# Patient Record
Sex: FEMALE | Race: WHITE | Employment: FULL TIME | URBAN - METROPOLITAN AREA
[De-identification: names, ages, dates, MRNs, and addresses within clinical notes are randomized per-mention and may not be internally consistent; named-entity substitution may affect disease eponyms.]

---

## 2022-10-24 NOTE — PROGRESS NOTES
[address]  11/7/22  Referring: ANNETTE Person     REASON FOR CONSULT/CHIEF COMPLAINT/HPI     Reason for visit/ Chief complaint     Chief Complaint   Patient presents with    New Patient    Chest Pain     sometimes    Other     Sometimes very sharp pain under shoulder blades    Shortness of Breath     W/exertion       HPI Racquel Valadez is a 56 y.o. new patient here by referral from [place], [place] for transfer of care from [facility]. Pt was seen by cardiology for clearance for colonoscopy on 9/22/22 and noted to be in intermittent idioventricular rhythm, LBBB pattern and NSVT while in office. Patient was sent to ED for further eval and admitted. She then underwent L&R Heart Cath. Family history shows Father having HTN. She has never smoked, current alcohol use of 5 drinks per week and does not have drug use history. Pt is a water . Today she complains of L chest pain and L pain under shoulder blade in back at times, but they do not happen at the same time. She denies SOB, dizziness or palpitations at the time. Overall she is NYHA I. She wants to start working out again and wants to know if that would be safe. Patient is adherent with medications and is tolerating them well without side effects     HISTORY/ALLERGIES/ROS     MedHx:  has a past medical history of Diabetes ([address] and Hyperlipidemia. SurgHx:  has no past surgical history on file. SocHx:  reports that she has never smoked. She has never used smokeless tobacco. She reports that she does not currently use alcohol. She reports that she does not use drugs. FamHx: No family history of premature coronary artery disease, sudden death, or aneurysm  Allergies: Lisinopril     MEDICATIONS      Prior to Admission medications    Medication Sig Start Date End Date Taking?  Authorizing Provider   atorvastatin (LIPITOR) 10 MG tablet TAKE 1 TABLET BY MOUTH EVERYDAY AT BEDTIME 10/20/22  Yes Historical Provider, MD   furosemide (LASIX) 20 MG tablet Take by mouth daily 10/18/22 11/17/22 Yes Historical Provider, MD   magnesium oxide (MAG-OX) 400 MG tablet Take by mouth daily 9/29/22  Yes Historical Provider, MD   metoprolol succinate (TOPROL XL) 25 MG extended release tablet Take by mouth daily 10/18/22 11/17/22 Yes Historical Provider, MD   potassium chloride (KLOR-CON) 10 MEQ extended release tablet Take by mouth 2 times daily 10/18/22 11/17/22 Yes Historical Provider, MD   sacubitril-valsartan (ENTRESTO) 24-26 MG per tablet Take 1 tablet by mouth 2 times daily 10/18/22 11/17/22 Yes Historical Provider, MD   traZODone (DESYREL) 100 MG tablet Take by mouth nightly as needed   Yes Historical Provider, MD   empagliflozin (JARDIANCE) 10 MG tablet Take 1 tablet by mouth daily 11/3/22  Yes Christelle Martino MD       PHYSICAL EXAM        Vitals:    11/07/22 0931   BP: 100/64   Pulse: 68   SpO2: 98%    Weight: 179 lb 9.6 oz (81.5 kg)     Gen Alert, cooperative, no distress Heart  Regular rate and rhythm, no murmur   Head Normocephalic, atraumatic, no abnormalities Abd  Soft, NT, +BS, no mass, no OM   Eyes PERRLA, conj/corn clear Ext  Ext nl, AT, no C/C, no edema   Nose Nares normal, no drain age, Non-tender Pulse 2+ and symmetric   Throat Lips, mucosa, tongue normal Skin Color/text/turg nl, no rash/lesions   Neck S/S, TM, NT, no bruit Psych Nl mood and affect   Lung CTA-B, unlabored, no DTP     Ch wall NT, no deform       LABS and Imaging     Relevant and available CV data reviewed    EKG personally interpreted: 9/23/22  Otherwise normal ECG    Sinus rhythm    Ventricular premature complex    Low voltage, precordial leads      Lipid 4/21/22  TG59 HDL55 [id number]   BNP 9/24/22 - 117  Troponin I 9/22/22 - 3     Echo 9/22/22  SUMMARY:   1. Left ventricle: The cavity size was mildly dilated. Wall      thickness was normal. Systolic function was moderately to      severely reduced.  The estimated ejection fraction was in the range of 30% to 35%. Severe diffuse hypokinesis with regional      variations. Doppler parameters are consistent with abnormal left      ventricular relaxation (grade 1 diastolic dysfunction). 2. Aortic valve: Peak velocity (S): 1.16m/sec. Mean gradient (S):      4mm Hg. VTI ratio of LVOT to aortic valve: 0.72. Valve area      (VTI): 2.25cm^2. 3. Mitral valve: There was mild regurgitation. 4. Left atrium: The atrium was mildly dilated. 5. Right ventricle: The cavity size was normal. Wall thickness was      normal. Systolic function was mildly reduced by visual      assessment. 6. Pulmonary arteries: Estimated PA peak pressure is 23mm Hg (S). 7. Pericardium, extracardiac: There was no pericardial effusion. Cath: 9/22/22  PROCEDURES PERFORMED:   1:  Selective angiography of the RCA in multiple views   2:  Selective angiography of the Left Coronary System in multiple   views   3:  [facility] with LV pressure measurement with LV-AO pullback gradient    CONCLUSIONS:   1:  No significant obstructive coronary artery disease   2:  Non ischemic cardiomyopathy        ModerateRisk  ModerateComplexity/Medical Decision Making    Outside/Care everywhere records Reviewed  Labs Reviewed  Prior Imaging, ekg, cath, echo reviewed when available  Medications reviewed  Old Notes reviewed  ASSESSMENT AND PLAN     1. 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  NSVT Plan  Toprol XL 25mg daily,potassium 10mEq BID ,magnesium 400mg daily   F/u with Dr. Nilda Weeks  Per Dr. Nilda Weeks likely LVOT versus RVOT tachy which are low-risk for sudden cardiac death    3. HTN   Needs better control of diet with low sodium and cholesterol and exercise regularly, taking meds on time   Plan: Entresto, Toprol XL    4. Pre-diabetes   Life style control   A1C 4/21/22 5.9  Plan: diet and exercise control   Patient counseled on lifestyle modification, diet, and exercise. 5. Preop cardiac examination for colonoscopy  Plan: she can proceed for colonoscopy at low cardiac risk as she is euvolemic, NYHA I, with normal coronaries, and on appropriate medications with no recurrence of arrhythmia. She will need to remain on all her cardiac meds periprocedurally. Follow Up: 3 months     Carla Barker MD   Cardiologist [facility]    Scribe Attestation: This note was scribed in the presence of Dr. Randa Sheppard  by Álvaro Martin RN. Physician Attestation  The scribe wrote this note in the presence of me Carla Barker MD). The scribe may have prepopulated components of this note with my historical  intellectual property under my direct supervision. Any additions to this intellectual property were performed in my presence and at my direction. Furthermore, the content and accuracy of this note have been reviewed by me with edits by me as needed.   Carla Barker MD 11/7/2022 10:45 AM

## 2022-10-25 PROBLEM — I47.29 NSVT (NONSUSTAINED VENTRICULAR TACHYCARDIA): Status: ACTIVE | Noted: 2022-10-25

## 2022-10-25 PROBLEM — R73.03 PRE-DIABETES: Status: ACTIVE | Noted: 2022-10-25

## 2022-10-25 PROBLEM — I10 HTN (HYPERTENSION), BENIGN: Status: ACTIVE | Noted: 2022-10-25

## 2022-10-25 PROBLEM — I42.8 NON-ISCHEMIC CARDIOMYOPATHY (HCC): Status: ACTIVE | Noted: 2022-10-25

## 2022-11-01 NOTE — PROGRESS NOTES
[facility]   Electrophysiology Consultation   Date: 11/3/2022  Reason for Consultation: NSVT    Consult Requesting Physician: Jj Retana MD    Chief Complaint   Patient presents with    New Patient     Transfer of care, pt reports SOB on exertion and sharp/dull pain under both shoulder blades    Shortness of Breath    Medication Refill     All rx's       CC: NSVT    HPI: Cosmo Peterson is a 56 y.o. female  past medical history HTN, pre-diabetes. She saw Dr. Hugo Hernandez  09/022/2022 for pre-op clearance. She was noted to have IVR, LBBB and NSVT on EKG in office. ( See media tab). She was sent to the ER for further evaluation. She had a coronary angiogram that showed  no significant obstructive disease . Echo showed LVEF 35% . NSVT was treated with amiodarone drip and then she was started on Toprol,  Entresto 09/2022  . Sent home on 30 day monitor. Interval History: Ceferino Stein presents today to discuss NSVT. She states she gets \" winded easily and has occasional sharp pain under her scapula from time to time. She had Covid Nov 2020. She has had no other viral illness since then that she is aware of. She states she wore a  30 day monitor but does not have any information on result. Assessment and plan:   Non ischemic cardiomyopathy   - LVEF  30-35%  09/2022    - allergy to lisinopril    - continue toprol, lasix, Entresto - started 09/2022    - up coming appointment with Dr. Sudarshan Braswell. - will start Jardiance today. If this is not covered by her insurance we can use Farxiga    - I would like her to have at least two months of medical therapy and then we can repeat monitor. 48 hours     NSVT    - [facility] 09/2022 - non obstructive disease.    - Continue Toprol    -TSH 1.532    - will repeat monitor  after at least two months of medical therapyo see if suppressed my new medications.    If NSVT and PVC persist and she Is symptomatic we can consider AAD/ablation.    - medical treatment is limited by cardiomyopathy    - can consider Cardiac MRI to r/o inflammatory process and scar    HLD   - continue Lipitor       In general, she has new diagnosis of nonischemic cardiomyopathy. She was also noted to have NSVT and PVCs. Review of her ECGs PVCs were relatively narrow with inferior axis and transition in V3 with left bundle branch block morphology this suggest an outflow track PVCs/VT which could be due to RVOT or LVOT. I very much doubt that these VT's and PVCs were a consequence of cardiomyopathy. It is possible that the cardiomyopathy triggered a focus that was less active before. At the same time it is not clear whether these PVCs were of high enough frequency that could have caused cardiomyopathy in the past.    At this point my recommendation is to continue with maximal medical therapy with increasing the dose of Entresto if possible and adding SGP L2 inhibitor. .  Irrespective of the findings on her current event monitor, I would repeat the Holter monitor in a month to see if the burden of PVC remains high despite good medical therapy. A cardiac MRI may also help to identify potential causes of her cardiomyopathy. Plan: We will treat heart failure first and see how she responds. She will see Dr. Adam Gutierrez. Will continue to monitor for arrhythmia  and cardiomyopathy. Plan to repeat 48 hour monitor after two months of medical therapy. Repeat imaging echo/cardiac MRI in two months. Patient Active Problem List    Diagnosis Date Noted    Non-ischemic cardiomyopathy ([address] 10/25/2022    NSVT (nonsustained ventricular tachycardia) 10/25/2022    HTN (hypertension), benign 10/25/2022    Pre-diabetes 10/25/2022     Diagnostic studies:   ECG 11/3/22  SR   407 QTcH, 96 QRS    Monitor- no results at this time. Son will attempt to obtain. Echo 09/22/2022 [address]:     1. Left ventricle: The cavity size was mildly dilated.  Wall      thickness was normal. Systolic function was moderately to      severely reduced. The estimated ejection fraction was in the      range of 30% to 35%. Severe diffuse hypokinesis with regional      variations. Doppler parameters are consistent with abnormal left      ventricular relaxation (grade 1 diastolic dysfunction). 2. Aortic valve: Peak velocity (S): 1.16m/sec. Mean gradient (S):      4mm Hg. VTI ratio of LVOT to aortic valve: 0.72. Valve area      (VTI): 2.25cm^2. 3. Mitral valve: There was mild regurgitation. 4. Left atrium: The atrium was mildly dilated. 5. Right ventricle: The cavity size was normal. Wall thickness was      normal. Systolic function was mildly reduced by visual      assessment. 6. Pulmonary arteries: Estimated PA peak pressure is 23mm Hg (S). 7. Pericardium, extracardiac: There was no pericardial effusion. Impressions:  No prior study was available for comparison. [address] [place] [address] 09/22/2022 [facility]   CONCLUSIONS:   1:  No significant obstructive coronary artery disease   2:  Non ischemic cardiomyopathy        I independently reviewed the cardiac diagnostic studies, ECG and relevant imaging studies. No results found for: LVEF, LVEFMODE  No results found for: TSHFT4, TSH    Physical Examination:  Vitals:    11/03/22 0901   BP: 112/62   Pulse: 64   SpO2: 99%      Wt Readings from Last 3 Encounters:   11/03/22 180 lb (81.6 kg)       Constitutional: Oriented. No distress. Head: Normocephalic and atraumatic. Mouth/Throat: Oropharynx is clear and moist.   Eyes: Conjunctivae normal. EOM are normal.   Neck: Neck supple. No rigidity. No JVD present. Cardiovascular: Normal rate, regular rhythm, S1&S2. Pulmonary/Chest: Bilateral respiratory sounds. No wheezes, No rhonchi. Abdominal: Soft. Bowel sounds present. No distension, No tenderness. Musculoskeletal: No tenderness. No edema    Lymphadenopathy: Has no cervical adenopathy. Neurological: Alert and oriented.  Cranial nerve appears intact, No Gross deficit Skin: Skin is warm and dry. No rash noted. Psychiatric: Has a normal behavior       Review of System:  [x] Full ROS obtained and negative except as mentioned in HPI    Prior to Admission medications    Medication Sig Start Date End Date Taking? Authorizing Provider   atorvastatin (LIPITOR) 10 MG tablet TAKE 1 TABLET BY MOUTH EVERYDAY AT BEDTIME 10/20/22  Yes Historical Provider, MD   furosemide (LASIX) 20 MG tablet Take by mouth daily 10/18/22 11/17/22 Yes Historical Provider, MD   magnesium oxide (MAG-OX) 400 MG tablet Take by mouth daily 9/29/22  Yes Historical Provider, MD   metoprolol succinate (TOPROL XL) 25 MG extended release tablet Take by mouth daily 10/18/22 11/17/22 Yes Historical Provider, MD   potassium chloride (KLOR-CON) 10 MEQ extended release tablet Take by mouth 2 times daily 10/18/22 11/17/22 Yes Historical Provider, MD   sacubitril-valsartan (ENTRESTO) 24-26 MG per tablet Take 1 tablet by mouth 2 times daily 10/18/22 11/17/22 Yes Historical Provider, MD   traZODone (DESYREL) 100 MG tablet Take by mouth nightly as needed   Yes Historical Provider, MD   empagliflozin (JARDIANCE) 10 MG tablet Take 1 tablet by mouth daily 11/3/22  Yes Christelle Martino MD   sacubitril-valsartan (ENTRESTO) 24-26 MG per tablet Take 1 tablet by mouth 2 times daily 11/3/22  Yes Christelle Martino MD   empagliflozin (JARDIANCE) 10 MG tablet Take 1 tablet by mouth daily 11/3/22  Yes Christelle Martino MD       History reviewed. No pertinent past medical history. History reviewed. No pertinent surgical history. Allergies   Allergen Reactions    Lisinopril Cough       Social History:  Reviewed. Family History:  Reviewed. Reviewed. No family history of SCD. Relevant and available labs, and cardiovascular diagnostics reviewed. Reviewed. I independently reviewed all cardiac tracing.     I independently reviewed relevant and available cardiac diagnostic tests ECG, CXR, Echo, Stress test, Device interrogation, Holter, CT scan. Outside medical records via Care everywhere reviewed and summarized in H&P above. Complex medical condition with multiple medical problems affecting prognosis and outcome of EP interventions       - The patient is counseled to follow a low salt diet to assure blood pressure remains controlled for cardiovascular risk factor modification.   - The patient is counseled to avoid excess caffeine, and energy drinks as this may exacerbated ectopy and arrhythmia. - The patient is counseled to get regular exercise 3-5 times per week to control cardiovascular risk factors. All questions and concerns were addressed to the patient/family. Alternatives to my treatment were discussed. I have discussed the above stated plan and the patient verbalized understanding and agreed with the plan. Scribe attestation: This note was scribed in the presence of  Aram Mehta MD by Mali Mar RN    I, Dr. Aram Mehta personally performed the services described in this documentation as scribed by RN in my presence, and it is both accurate and complete. NOTE: This report was transcribed using voice recognition software. Every effort was made to ensure accuracy, however, inadvertent computerized transcription errors may be present.      Aram Mehta MD, [facility], [address] [place] [address]   Office: [phone]  Fax: [phone]

## 2022-11-03 ENCOUNTER — OFFICE VISIT (OUTPATIENT)
Dept: CARDIOLOGY CLINIC | Age: 57
End: 2022-11-03
Payer: COMMERCIAL

## 2022-11-03 VITALS
DIASTOLIC BLOOD PRESSURE: 62 MMHG | WEIGHT: 180 LBS | OXYGEN SATURATION: 99 % | HEIGHT: 67 IN | SYSTOLIC BLOOD PRESSURE: 112 MMHG | HEART RATE: 64 BPM | BODY MASS INDEX: 28.25 KG/M2

## 2022-11-03 DIAGNOSIS — I10 HTN (HYPERTENSION), BENIGN: ICD-10-CM

## 2022-11-03 DIAGNOSIS — I47.29 NSVT (NONSUSTAINED VENTRICULAR TACHYCARDIA): Primary | ICD-10-CM

## 2022-11-03 DIAGNOSIS — I42.8 NON-ISCHEMIC CARDIOMYOPATHY (HCC): ICD-10-CM

## 2022-11-03 PROCEDURE — 99204 OFFICE O/P NEW MOD 45 MIN: CPT | Performed by: INTERNAL MEDICINE

## 2022-11-03 PROCEDURE — 3074F SYST BP LT 130 MM HG: CPT | Performed by: INTERNAL MEDICINE

## 2022-11-03 PROCEDURE — 93000 ELECTROCARDIOGRAM COMPLETE: CPT | Performed by: INTERNAL MEDICINE

## 2022-11-03 PROCEDURE — 3078F DIAST BP <80 MM HG: CPT | Performed by: INTERNAL MEDICINE

## 2022-11-03 RX ORDER — TRAZODONE HYDROCHLORIDE 100 MG/1
TABLET ORAL NIGHTLY PRN
COMMUNITY

## 2022-11-03 RX ORDER — POTASSIUM CHLORIDE 750 MG/1
TABLET, FILM COATED, EXTENDED RELEASE ORAL 2 TIMES DAILY
COMMUNITY
Start: 2022-10-18 | End: 2022-11-07 | Stop reason: ALTCHOICE

## 2022-11-03 RX ORDER — METOPROLOL SUCCINATE 25 MG/1
TABLET, EXTENDED RELEASE ORAL DAILY
COMMUNITY
Start: 2022-10-18 | End: 2022-11-07 | Stop reason: SDUPTHER

## 2022-11-03 RX ORDER — MAGNESIUM OXIDE 400 MG/1
TABLET ORAL DAILY
COMMUNITY
Start: 2022-09-29

## 2022-11-03 RX ORDER — ATORVASTATIN CALCIUM 10 MG/1
TABLET, FILM COATED ORAL
COMMUNITY
Start: 2022-10-20 | End: 2022-11-07 | Stop reason: SDUPTHER

## 2022-11-03 RX ORDER — FUROSEMIDE 20 MG/1
TABLET ORAL DAILY
COMMUNITY
Start: 2022-10-18 | End: 2022-11-07 | Stop reason: SDUPTHER

## 2022-11-07 ENCOUNTER — TELEPHONE (OUTPATIENT)
Dept: CARDIOLOGY CLINIC | Age: 57
End: 2022-11-07

## 2022-11-07 ENCOUNTER — OFFICE VISIT (OUTPATIENT)
Dept: CARDIOLOGY CLINIC | Age: 57
End: 2022-11-07
Payer: COMMERCIAL

## 2022-11-07 VITALS
WEIGHT: 179.6 LBS | HEIGHT: 67 IN | BODY MASS INDEX: 28.19 KG/M2 | SYSTOLIC BLOOD PRESSURE: 100 MMHG | HEART RATE: 68 BPM | OXYGEN SATURATION: 98 % | DIASTOLIC BLOOD PRESSURE: 64 MMHG

## 2022-11-07 DIAGNOSIS — I42.8 NON-ISCHEMIC CARDIOMYOPATHY (HCC): Primary | ICD-10-CM

## 2022-11-07 DIAGNOSIS — I10 HTN (HYPERTENSION), BENIGN: ICD-10-CM

## 2022-11-07 DIAGNOSIS — I47.29 NSVT (NONSUSTAINED VENTRICULAR TACHYCARDIA): ICD-10-CM

## 2022-11-07 DIAGNOSIS — R73.03 PRE-DIABETES: ICD-10-CM

## 2022-11-07 PROCEDURE — 3074F SYST BP LT 130 MM HG: CPT | Performed by: INTERNAL MEDICINE

## 2022-11-07 PROCEDURE — 3078F DIAST BP <80 MM HG: CPT | Performed by: INTERNAL MEDICINE

## 2022-11-07 PROCEDURE — 99204 OFFICE O/P NEW MOD 45 MIN: CPT | Performed by: INTERNAL MEDICINE

## 2022-11-07 RX ORDER — ATORVASTATIN CALCIUM 10 MG/1
10 TABLET, FILM COATED ORAL DAILY
Qty: 90 TABLET | Refills: 3 | Status: SHIPPED | OUTPATIENT
Start: 2022-11-07

## 2022-11-07 RX ORDER — METOPROLOL SUCCINATE 25 MG/1
25 TABLET, EXTENDED RELEASE ORAL DAILY
Qty: 90 TABLET | Refills: 3 | Status: SHIPPED | OUTPATIENT
Start: 2022-11-07 | End: 2022-12-07

## 2022-11-07 RX ORDER — FUROSEMIDE 20 MG/1
20 TABLET ORAL DAILY
Qty: 90 TABLET | Refills: 3 | Status: SHIPPED | OUTPATIENT
Start: 2022-11-07 | End: 2022-12-07

## 2022-11-07 RX ORDER — SPIRONOLACTONE 25 MG/1
25 TABLET ORAL DAILY
Qty: 90 TABLET | Refills: 3 | Status: SHIPPED | OUTPATIENT
Start: 2022-11-07

## 2022-11-07 NOTE — TELEPHONE ENCOUNTER
Called patient and let her know that [address] would like her to discontinue taking Potassium daily due to starting Aldactone today. Pt verbalized understanding and was agreeable to plan. No further questions at this time.

## 2022-11-07 NOTE — LETTER
[facility] CARDIOLOGY[address]  Dept: [phone]  Dept Fax: [phone]      2022    Jennifer Rudd  : 1965  DOS: 2022    To Whom it May Concern:    Diana Wiley has been evaluated for cardiac clearance. Based on diagnostic testing including Echo and Left heart Cath  , Diana Wiley is considered at a Low risk for surgery. There is no further cardiac testing that could be done to lower the risk. Please let my office know if you have any questions or concerns.           Stephanie Bragg MD                           A [facility] healthcare ministry serving [place] and [place]

## 2022-11-15 ENCOUNTER — TELEPHONE (OUTPATIENT)
Dept: CARDIOLOGY CLINIC | Age: 57
End: 2022-11-15

## 2022-11-15 NOTE — TELEPHONE ENCOUNTER
Pt called to inform the office that she not  able to get on a program from the company to get Entresto at a reasonable cost.  Pt is wanting to know if there's an alternative med that  [facility] can  prescribe her.   Please advise

## 2022-11-21 ENCOUNTER — TELEPHONE (OUTPATIENT)
Dept: CARDIOLOGY CLINIC | Age: 57
End: 2022-11-21

## 2022-11-21 NOTE — TELEPHONE ENCOUNTER
Patient's son [facility] called and asked if PA had been done on patient's Entresto yet. It was sent to pharmacy on 11/7/22 and PA has not gone through yet.

## 2022-11-22 ENCOUNTER — TELEPHONE (OUTPATIENT)
Dept: CARDIOLOGY CLINIC | Age: 57
End: 2022-11-22

## 2022-11-22 NOTE — TELEPHONE ENCOUNTER
I did complete a Cover my Meds for her Marychuy Urbanot [address] [facility], [place] Case ID  [id number]. I then called Mrs Bart Mi and gave her some options as per Dr Mikhail Alanis message. I let her know that I had run a PA - Cover my Meds, and hopefully we should know more within the next week due to the holiday. I talked to her about maybe starting just the Losartan 50 mg if she couldn't afford the [employer], she would like to see if the PA helps. In the meantime I have given her 4 bottles of Entresto, her son Amy London will pick them up tomorrow morning after his shift in [employer] as he works in our hospital. I was concerned with the holidays and need maybe for more information required to get approval she should have some extra. She denied any further questions or concerns. I told her as soon as I know anything I will let her know.

## 2022-12-01 ENCOUNTER — TELEPHONE (OUTPATIENT)
Dept: CARDIOLOGY CLINIC | Age: 57
End: 2022-12-01

## 2022-12-19 ENCOUNTER — HOSPITAL ENCOUNTER (OUTPATIENT)
Dept: NON INVASIVE DIAGNOSTICS | Age: 57
Discharge: HOME | End: 2022-12-19
Payer: COMMERCIAL

## 2022-12-19 DIAGNOSIS — I47.29 NSVT (NONSUSTAINED VENTRICULAR TACHYCARDIA): ICD-10-CM

## 2022-12-19 DIAGNOSIS — I42.8 NON-ISCHEMIC CARDIOMYOPATHY (HCC): ICD-10-CM

## 2022-12-19 PROCEDURE — 93308 TTE F-UP OR LMTD: CPT

## 2022-12-20 ENCOUNTER — TELEPHONE (OUTPATIENT)
Dept: CARDIOLOGY CLINIC | Age: 57
End: 2022-12-20

## 2022-12-20 NOTE — TELEPHONE ENCOUNTER
----- Message from Lurdes Sousa MD sent at 12/19/2022  5:31 PM EST -----  Echo looks better, LVEF improved to 40%

## 2023-01-11 NOTE — PROGRESS NOTES
[facility]   Electrophysiology    Date: 1/12/2023    Chief Complaint   Patient presents with    Other     NSVT, pain under LT shoulder blade -intermittent    Follow-up     6 wk         CC: NSVT/ cardiomyopathy    HPI: Phyllis Tsai is a 56 y.o. female  past medical history HTN, pre-diabetes. She saw Dr. Candis Barroso [facility]) 09/022/2022 for pre-op clearance. She was noted to have IVR, LBBB and NSVT on EKG in office. ( See media tab). She was sent to the ER for further evaluation. She had a coronary angiogram that showed  no significant obstructive disease . Echo showed LVEF 35% . NSVT was treated with amiodarone drip and then she was started on Toprol,  Entresto 09/2022. Sent home on 30 day monitor. - have been unable to obtain results           Interval History:  Lloyd Brown presents today in follow up. She has not started the Jardiance  yet and Is waiting on PA . Assessment and plan:   Non ischemic cardiomyopathy   - LVEF  12/19/2022 40%    - LVEF  30-35%  09/2022    - allergy to lisinopril    - continue  Jardiance, toprol, lasix, Entresto - started 09/2022, aldactone added 11/07/2022     - follows with      We will work on getting prior authorization for [employer]. She will continue the rest of her medication. She has class I indication for SGLT2 inhibitors. NSVT    - [facility] 09/2022 - non obstructive disease.    - Continue Toprol , magnesiu,    -TSH 1.532    - will repeat monitor    - If NSVT and PVC persist and she Is symptomatic we can consider AAD/ablation.    - medical treatment is limited by cardiomyopathy    -  will repeat Monitor.   If she has high buren of PVC  can consider Cardiac MRI to r/o inflammatory process and scar    HLD   - continue Lipitor             Plan:   7 days monitor  4 months Follow  up             Patient Active Problem List    Diagnosis Date Noted    Non-ischemic cardiomyopathy ([address] 10/25/2022    NSVT (nonsustained ventricular tachycardia) 10/25/2022 HTN (hypertension), benign 10/25/2022    Pre-diabetes 10/25/2022     Diagnostic studies:   ECG 1/12/23  SR , QTcH 439,QRS 98      Monitor- no results at this time. Son will attempt to obtain. Echo 12/19/2022   Summary   Technically limited due to lung interface. Normal left ventricle size and wall thickness. There is diffuse hypokinesis. Overall left ventricular systolic function appears moderately reduced. Ejection fraction is visually estimated at 40%. Echo 09/22/2022 [address]:     1. Left ventricle: The cavity size was mildly dilated. Wall      thickness was normal. Systolic function was moderately to      severely reduced. The estimated ejection fraction was in the      range of 30% to 35%. Severe diffuse hypokinesis with regional      variations. Doppler parameters are consistent with abnormal left      ventricular relaxation (grade 1 diastolic dysfunction). 2. Aortic valve: Peak velocity (S): 1.16m/sec. Mean gradient (S):      4mm Hg. VTI ratio of LVOT to aortic valve: 0.72. Valve area      (VTI): 2.25cm^2. 3. Mitral valve: There was mild regurgitation. 4. Left atrium: The atrium was mildly dilated. 5. Right ventricle: The cavity size was normal. Wall thickness was      normal. Systolic function was mildly reduced by visual      assessment. 6. Pulmonary arteries: Estimated PA peak pressure is 23mm Hg (S). 7. Pericardium, extracardiac: There was no pericardial effusion. Impressions:  No prior study was available for comparison. [address] [place] [address] 09/22/2022 [facility]   CONCLUSIONS:   1:  No significant obstructive coronary artery disease   2:  Non ischemic cardiomyopathy        I independently reviewed the cardiac diagnostic studies, ECG and relevant imaging studies.      Lab Results   Component Value Date    LVEF 40 12/19/2022     No results found for: TSHFT4, TSH    Physical Examination:  Vitals:    01/12/23 0743   BP: 112/82   Pulse: 64   SpO2: 97%        Wt Readings from Last 3 Encounters:   01/12/23 179 lb 3.2 oz (81.3 kg)   11/07/22 179 lb 9.6 oz (81.5 kg)   11/03/22 180 lb (81.6 kg)       Constitutional: Oriented. No distress. Head: Normocephalic and atraumatic. Mouth/Throat: Oropharynx is clear and moist.   Eyes: Conjunctivae normal. EOM are normal.   Neck: Neck supple. No rigidity. No JVD present. Cardiovascular: Normal rate, regular rhythm, S1&S2. Pulmonary/Chest: Bilateral respiratory sounds. No wheezes, No rhonchi. Abdominal: Soft. Bowel sounds present. No distension, No tenderness. Musculoskeletal: No tenderness. No edema    Lymphadenopathy: Has no cervical adenopathy. Neurological: Alert and oriented. Cranial nerve appears intact, No Gross deficit   Skin: Skin is warm and dry. No rash noted. Psychiatric: Has a normal behavior       Review of System:  [x] Full ROS obtained and negative except as mentioned in HPI    Prior to Admission medications    Medication Sig Start Date End Date Taking?  Authorizing Provider   sacubitril-valsartan (ENTRESTO) 24-26 MG per tablet Take 1 tablet by mouth 2 times daily Given 4 bottles LOT [id number] Exp 2/2025 11/22/22  Yes Helen Antonio MD   spironolactone (ALDACTONE) 25 MG tablet Take 1 tablet by mouth daily 11/7/22  Yes Helen Antonio MD   atorvastatin (LIPITOR) 10 MG tablet Take 1 tablet by mouth daily 11/7/22  Yes Helen Antonio MD   furosemide (LASIX) 20 MG tablet Take 1 tablet by mouth daily 11/7/22 1/12/23 Yes Helen Antonio MD   metoprolol succinate (TOPROL XL) 25 MG extended release tablet Take 1 tablet by mouth daily 11/7/22 1/12/23 Yes Helen Antonio MD   magnesium oxide (MAG-OX) 400 MG tablet Take by mouth daily 9/29/22  Yes Historical Provider, MD   traZODone (DESYREL) 100 MG tablet Take by mouth nightly as needed   Yes Historical Provider, MD   empagliflozin (JARDIANCE) 10 MG tablet Take 1 tablet by mouth daily  Patient not taking: Reported on 1/12/2023 11/3/22   Cherry Ravi MD       Past Medical History:   Diagnosis Date    Diabetes ([facility] [address]     type 2    Hyperlipidemia         No past surgical history on file. Allergies   Allergen Reactions    Lisinopril Cough       Social History:  Reviewed. reports that she has never smoked. She has never used smokeless tobacco. She reports that she does not currently use alcohol. She reports that she does not use drugs. Family History:  Reviewed. Reviewed. No family history of SCD. Relevant and available labs, and cardiovascular diagnostics reviewed. Reviewed. I independently reviewed all cardiac tracing. I independently reviewed relevant and available cardiac diagnostic tests ECG, CXR, Echo, Stress test, Device interrogation, Holter, CT scan. Outside medical records via Care everywhere reviewed and summarized in H&P above. Complex medical condition with multiple medical problems affecting prognosis and outcome of EP interventions       - The patient is counseled to follow a low salt diet to assure blood pressure remains controlled for cardiovascular risk factor modification.   - The patient is counseled to avoid excess caffeine, and energy drinks as this may exacerbated ectopy and arrhythmia. - The patient is counseled to get regular exercise 3-5 times per week to control cardiovascular risk factors. All questions and concerns were addressed to the patient/family. Alternatives to my treatment were discussed. I have discussed the above stated plan and the patient verbalized understanding and agreed with the plan. Scribe attestation: This note was scribed in the presence of Christal Lesches MD by Beck Chun RN    I, Dr. Christal Lesches personally performed the services described in this documentation as scribed by RN in my presence, and it is both accurate and complete. NOTE: This report was transcribed using voice recognition software.  Every effort was made to ensure accuracy, however, inadvertent computerized transcription errors may be present.      Jordana Brooks MD, [facility], [address] [place] [address]   Office: [phone]  Fax: [phone]

## 2023-01-12 ENCOUNTER — OFFICE VISIT (OUTPATIENT)
Dept: CARDIOLOGY CLINIC | Age: 58
End: 2023-01-12
Payer: COMMERCIAL

## 2023-01-12 ENCOUNTER — TELEPHONE (OUTPATIENT)
Dept: CARDIOLOGY CLINIC | Age: 58
End: 2023-01-12

## 2023-01-12 VITALS
HEIGHT: 67 IN | DIASTOLIC BLOOD PRESSURE: 82 MMHG | OXYGEN SATURATION: 97 % | BODY MASS INDEX: 28.12 KG/M2 | SYSTOLIC BLOOD PRESSURE: 112 MMHG | WEIGHT: 179.2 LBS | HEART RATE: 64 BPM

## 2023-01-12 DIAGNOSIS — I10 HTN (HYPERTENSION), BENIGN: ICD-10-CM

## 2023-01-12 DIAGNOSIS — E78.2 MIXED HYPERLIPIDEMIA: ICD-10-CM

## 2023-01-12 DIAGNOSIS — I42.8 NON-ISCHEMIC CARDIOMYOPATHY (HCC): Primary | ICD-10-CM

## 2023-01-12 DIAGNOSIS — I47.29 NSVT (NONSUSTAINED VENTRICULAR TACHYCARDIA): ICD-10-CM

## 2023-01-12 PROCEDURE — 99214 OFFICE O/P EST MOD 30 MIN: CPT | Performed by: INTERNAL MEDICINE

## 2023-01-12 PROCEDURE — 3079F DIAST BP 80-89 MM HG: CPT | Performed by: INTERNAL MEDICINE

## 2023-01-12 PROCEDURE — 3074F SYST BP LT 130 MM HG: CPT | Performed by: INTERNAL MEDICINE

## 2023-01-12 PROCEDURE — 93000 ELECTROCARDIOGRAM COMPLETE: CPT | Performed by: INTERNAL MEDICINE

## 2023-01-12 NOTE — PROGRESS NOTES
7 Day E-Patch requested for pt. Device was registered and placed. Tutorial given, pt verbalized understanding.  Date 01/12/2023   Time 08:00am    S/N  [id number]

## 2023-01-19 NOTE — TELEPHONE ENCOUNTER
Connor Braden was initially ordered by Dr. Elle Jean-Baptiste. Entresto apeal was denied. See in Media tab 12/08/2022 -     Please call patient - will resubmit Jardiance.

## 2023-01-19 NOTE — TELEPHONE ENCOUNTER
Shanae Denny MA will work on [employer] as Dasha Mccarthy is the initial prescriber of it. I will resubmit PA for Jardiance as is was prescirbed by A.

## 2023-01-20 NOTE — TELEPHONE ENCOUNTER
Jardiance PA submitted yesterday, approval letter was faxed to office today and is in scan bin. Called pt to advise her, no answer, left vm.

## 2023-01-25 NOTE — TELEPHONE ENCOUNTER
Spoke to pt and told her we got denial for entresto appeal per [facility]. Pt v/u and Pt scheduled on 2/13/23 @ 1pm with CELY.

## 2023-02-09 PROBLEM — E78.2 MIXED HYPERLIPIDEMIA: Status: ACTIVE | Noted: 2023-02-09

## 2023-02-09 NOTE — PROGRESS NOTES
[address]  2/13/23  Referring: ANNETTE Stein     REASON FOR CONSULT/CHIEF COMPLAINT/HPI     Reason for visit/ Chief complaint     Chief Complaint   Patient presents with    Cardiomyopathy     LT shoulder blade diminishing    3 Month Follow-Up         HPI Magda Singh is a 56 y.o. female patient here for scheduled 3 month evaluation and treatment of CHF. For review, she was found in 9/2022 to have nonischemic cardiomyopathy, initially diagnosed at [facility]. Her coronaries were normal by cath. She was found to have an outflow tract VT, thus she was referred to Dr. Mine Matos, who then referred her to me for ongoing management of her systolic heart failure. I am amazed and unclear why her private health insurance via [employer] continues to refuse to pay for her Entresto. Patient states it isn't covered due to lack of a generic. Her Jardiance did get covered. She has been taking samples of Entresto we have given her, and has tolerated them well without side effects, but is almost out of them. She had a repeat echo in Dec 2022 w/ LVEF 40%. At last visit she complains of L chest pain and L pain under shoulder blade in back at times, but they do not happen at the same time. We have determined that these are likely concurrent with her runs of VT. Since our visit she saw Dr Mine Matos on 1/12/23. He repeated her monitor x7days and she will follow up with him in 4 months. Today she states she feels fine, no complaints of CP, SOB, Palpitations or dizziness at this time. Patient is adherent with medications and is tolerating them well without side effects     HISTORY/ALLERGIES/ROS     MedHx:  has a past medical history of Diabetes ([address] and Hyperlipidemia. SurgHx:  has no past surgical history on file. SocHx:  reports that she has never smoked. She has never used smokeless tobacco. She reports that she does not currently use alcohol. She reports that she does not use drugs.    FamHx: No family history of premature coronary artery disease, sudden death, or aneurysm  Allergies: Lisinopril     MEDICATIONS      Prior to Admission medications    Medication Sig Start Date End Date Taking? Authorizing Provider   estradiol (ESTRACE) 0.1 MG/GM vaginal cream PLACE 2 GM PER VAGINA AT BEDTIME FOR 4 WEEKS.  THEN TWICE WEEKLY AFTER THAT. 1/26/23  Yes Historical Provider, MD   valsartan (DIOVAN) 80 MG tablet Take 1 tablet by mouth daily 2/13/23  Yes Alison Fuller MD   empagliflozin (JARDIANCE) 10 MG tablet Take 1 tablet by mouth daily 1/12/23  Yes Carole Babinski, MD   spironolactone (ALDACTONE) 25 MG tablet Take 1 tablet by mouth daily 11/7/22  Yes Alison Fuller MD   atorvastatin (LIPITOR) 10 MG tablet Take 1 tablet by mouth daily 11/7/22  Yes Alison Fuller MD   furosemide (LASIX) 20 MG tablet Take 1 tablet by mouth daily 11/7/22 2/13/23 Yes Alison Fuller MD   metoprolol succinate (TOPROL XL) 25 MG extended release tablet Take 1 tablet by mouth daily 11/7/22 2/13/23 Yes Alison Fuller MD   magnesium oxide (MAG-OX) 400 MG tablet Take by mouth daily 9/29/22  Yes Historical Provider, MD   traZODone (DESYREL) 100 MG tablet Take by mouth nightly as needed   Yes Historical Provider, MD       PHYSICAL EXAM        Vitals:    02/13/23 1304   BP: 116/76   Pulse: 77   SpO2: 97%      Weight: 178 lb 4.8 oz (80.9 kg)     Gen Alert, cooperative, no distress Heart  Regular rate and rhythm, no murmur   Head Normocephalic, atraumatic, no abnormalities Abd  Soft, NT, +BS, no mass, no OM   Eyes PERRLA, conj/corn clear Ext  Ext nl, AT, no C/C, no edema   Nose Nares normal, no drain age, Non-tender Pulse 2+ and symmetric   Throat Lips, mucosa, tongue normal Skin Color/text/turg nl, no rash/lesions   Neck S/S, TM, NT, no bruit Psych Nl mood and affect   Lung CTA-B, unlabored, no DTP     Ch wall NT, no deform       LABS and Imaging     Relevant and available CV data reviewed    A1c 1/6/23 5.9  Lipid 1/6/23  TG79 HDL54 LDL89   BNP 9/24/22 - 117  Troponin I 9/22/22 - 3     EKG personally interpreted: 1/12/23  Sinus  Rhythm   Low voltage in precordial leads.   - Nonspecific T-abnormality. Repeat Holter 1/12-1/19/23  NSR, Symptoms with sinus with and without PACs   8% PAC 1%PVC     Monitor 30 days from [place] but have been unable to obtain the results. Echo 12/19/22   Summary   Technically limited due to lung interface. Normal left ventricle size and wall thickness. There is diffuse hypokinesis. Overall left ventricular systolic function appears moderately reduced. Ejection fraction is visually estimated at 40%. Echo 9/22/22 [place] [facility]   SUMMARY:   1. Left ventricle: The cavity size was mildly dilated. Wall      thickness was normal. Systolic function was moderately to      severely reduced. The estimated ejection fraction was in the      range of 30% to 35%. Severe diffuse hypokinesis with regional      variations. Doppler parameters are consistent with abnormal left      ventricular relaxation (grade 1 diastolic dysfunction). 2. Aortic valve: Peak velocity (S): 1.16m/sec. Mean gradient (S):      4mm Hg. VTI ratio of LVOT to aortic valve: 0.72. Valve area      (VTI): 2.25cm^2. 3. Mitral valve: There was mild regurgitation. 4. Left atrium: The atrium was mildly dilated. 5. Right ventricle: The cavity size was normal. Wall thickness was      normal. Systolic function was mildly reduced by visual      assessment. 6. Pulmonary arteries: Estimated PA peak pressure is 23mm Hg (S). 7. Pericardium, extracardiac: There was no pericardial effusion.        Cath: 9/22/22 [place] [facility]   PROCEDURES PERFORMED:   1:  Selective angiography of the RCA in multiple views   2:  Selective angiography of the Left Coronary System in multiple   views   3:  [facility] with LV pressure measurement with LV-AO pullback gradient    CONCLUSIONS:   1:  No significant obstructive coronary artery disease   2:  Non ischemic cardiomyopathy        ModerateRisk  ModerateComplexity/Medical Decision Making    Outside/Care everywhere records Reviewed  Labs Reviewed  Prior Imaging, ekg, cath, echo reviewed when available  Medications reviewed  Old Notes reviewed  ASSESSMENT AND PLAN     1. Non Ischemic Cardiomyopathy   LVEF 40%, NYHA class I  No indication for ICD  Continue goal directed medical therapy for systolic heart failure      She is NYHA class I; I suspect her RVOT VT sustained itself long enough to cause a tachy-mediated caridiomyopathy and I am optimistic it will normalize with goal directed medical therapy for systolic heart failure    I am unclear why [facility] refuses to pay for her Richard Farias, despite a Class I recommendation for Entresto as first-line therapy for systolic heart failure  Change Entresto to valsartan 80 mg daily  Continue metoprolol, spironolactone, SGTL2 at current doses      2. NSVT   Toprol XL 25mg daily,potassium 10mEq BID ,magnesium 400mg daily   F/u with Dr. Obie Mendoza  Pending results of event monitor may need an MRI and RVOT/LVOT VT ablation  Per Dr. Obie Mendoza likely LVOT versus RVOT tachy which are low-risk for sudden cardiac death  Entresto denied by insurance   Plan: Continue Toprol, MagOx 400mg daily    3. HTN   Entresto denied by insurance   Needs better control of diet with low sodium and cholesterol and exercise regularly, taking meds on time   Plan:  Toprol XL. Valsartan. Patient counseled on lifestyle modification, diet, and exercise. 4.Pre-diabetes   Life style control   A1C 4/21/22 5.9  Plan: diet and exercise control. Patient counseled on lifestyle modification, diet, and exercise. 5. HLD   Lipitor 10mg daily  Plan: Continue Lipitor     Follow Up: 3 months     Dale Chi MD   Cardiologist [address]    Scribe Attestation: This note was scribed in the presence of Dr. Juan José Rosen  by Nehemiah Elder, SUNITA.     Physician Attestation  The scribe wrote this note in the presence of le Beltran Alexandra Larkin MD). The scribe may have prepopulated components of this note with my historical  intellectual property under my direct supervision. Any additions to this intellectual property were performed in my presence and at my direction. Furthermore, the content and accuracy of this note have been reviewed by me with edits by me as needed.   Courtney Lopez MD 2/13/2023 2:42 PM

## 2023-02-13 ENCOUNTER — OFFICE VISIT (OUTPATIENT)
Dept: CARDIOLOGY CLINIC | Age: 58
End: 2023-02-13
Payer: COMMERCIAL

## 2023-02-13 VITALS
HEIGHT: 67 IN | DIASTOLIC BLOOD PRESSURE: 76 MMHG | WEIGHT: 178.3 LBS | SYSTOLIC BLOOD PRESSURE: 116 MMHG | OXYGEN SATURATION: 97 % | HEART RATE: 77 BPM | BODY MASS INDEX: 27.99 KG/M2

## 2023-02-13 DIAGNOSIS — I10 HTN (HYPERTENSION), BENIGN: ICD-10-CM

## 2023-02-13 DIAGNOSIS — I47.29 NSVT (NONSUSTAINED VENTRICULAR TACHYCARDIA): ICD-10-CM

## 2023-02-13 DIAGNOSIS — R73.03 PRE-DIABETES: ICD-10-CM

## 2023-02-13 DIAGNOSIS — I42.8 NON-ISCHEMIC CARDIOMYOPATHY (HCC): Primary | ICD-10-CM

## 2023-02-13 DIAGNOSIS — E78.2 MIXED HYPERLIPIDEMIA: ICD-10-CM

## 2023-02-13 PROCEDURE — 3074F SYST BP LT 130 MM HG: CPT | Performed by: INTERNAL MEDICINE

## 2023-02-13 PROCEDURE — 99214 OFFICE O/P EST MOD 30 MIN: CPT | Performed by: INTERNAL MEDICINE

## 2023-02-13 PROCEDURE — 3078F DIAST BP <80 MM HG: CPT | Performed by: INTERNAL MEDICINE

## 2023-02-13 RX ORDER — ESTRADIOL 0.1 MG/G
CREAM VAGINAL
COMMUNITY
Start: 2023-01-26

## 2023-02-13 RX ORDER — VALSARTAN 80 MG/1
80 TABLET ORAL DAILY
Qty: 90 TABLET | Refills: 1 | Status: SHIPPED | OUTPATIENT
Start: 2023-02-13

## 2023-02-13 NOTE — PATIENT INSTRUCTIONS
Follow up with Dr Tommy Gomez in 3 months   Stop Entresto  Start Valsartan 80mg daily   Call for any questions or concerns.

## 2023-05-10 NOTE — PROGRESS NOTES
1/6/23  TG79 HDL54 LDL89   BNP 9/24/22 - 117  Troponin I 9/22/22 - 3     EKG personally interpreted: 1/12/23  Sinus  Rhythm   Low voltage in precordial leads.   - Nonspecific T-abnormality. Repeat Holter 1/12-1/19/23  NSR, Symptoms with sinus with and without PACs   8% PAC 1%PVC     Monitor 30 days from [place] but have been unable to obtain the results. Echo 12/19/22   Summary   Technically limited due to lung interface. Normal left ventricle size and wall thickness. There is diffuse hypokinesis. Overall left ventricular systolic function appears moderately reduced. Ejection fraction is visually estimated at 40%. Echo 9/22/22 [place] [facility]   SUMMARY:   1. Left ventricle: The cavity size was mildly dilated. Wall      thickness was normal. Systolic function was moderately to      severely reduced. The estimated ejection fraction was in the      range of 30% to 35%. Severe diffuse hypokinesis with regional      variations. Doppler parameters are consistent with abnormal left      ventricular relaxation (grade 1 diastolic dysfunction). 2. Aortic valve: Peak velocity (S): 1.16m/sec. Mean gradient (S):      4mm Hg. VTI ratio of LVOT to aortic valve: 0.72. Valve area      (VTI): 2.25cm^2. 3. Mitral valve: There was mild regurgitation. 4. Left atrium: The atrium was mildly dilated. 5. Right ventricle: The cavity size was normal. Wall thickness was      normal. Systolic function was mildly reduced by visual      assessment. 6. Pulmonary arteries: Estimated PA peak pressure is 23mm Hg (S). 7. Pericardium, extracardiac: There was no pericardial effusion.        Cath: 9/22/22 [place] [facility]   PROCEDURES PERFORMED:   1:  Selective angiography of the RCA in multiple views   2:  Selective angiography of the Left Coronary System in multiple   views   3:  C with LV pressure measurement with LV-AO pullback gradient    CONCLUSIONS:   1:  No significant obstructive coronary artery disease   2:  Non

## 2023-05-12 ENCOUNTER — OFFICE VISIT (OUTPATIENT)
Dept: CARDIOLOGY CLINIC | Age: 58
End: 2023-05-12

## 2023-05-12 VITALS
BODY MASS INDEX: 28.06 KG/M2 | WEIGHT: 178.8 LBS | DIASTOLIC BLOOD PRESSURE: 76 MMHG | SYSTOLIC BLOOD PRESSURE: 102 MMHG | HEART RATE: 76 BPM | OXYGEN SATURATION: 95 % | HEIGHT: 67 IN

## 2023-05-12 DIAGNOSIS — E78.2 MIXED HYPERLIPIDEMIA: ICD-10-CM

## 2023-05-12 DIAGNOSIS — I47.29 NSVT (NONSUSTAINED VENTRICULAR TACHYCARDIA) (HCC): ICD-10-CM

## 2023-05-12 DIAGNOSIS — R73.03 PRE-DIABETES: ICD-10-CM

## 2023-05-12 DIAGNOSIS — I42.8 NON-ISCHEMIC CARDIOMYOPATHY (HCC): Primary | ICD-10-CM

## 2023-05-12 DIAGNOSIS — I10 HTN (HYPERTENSION), BENIGN: ICD-10-CM

## 2023-05-12 RX ORDER — BUSPIRONE HYDROCHLORIDE 7.5 MG/1
TABLET ORAL
COMMUNITY
Start: 2023-05-08

## 2023-05-12 NOTE — PATIENT INSTRUCTIONS
Follow up with Dr Nery Lawrence in 1 year with Echo same day   Keep follow up with Dr Candace Bellamy, you can call to see if they still feel you need to be seen. Call for any questions or concerns.
BINDU from home reporting 4-5 days of continuous drinking (beer) and anxiety, he "doesn't want to die in a basement." Intoxicated, reports that his mother is dying and he is very upset. Reports SOB and chest pain related to anxiety. EKG in progress.

## 2023-05-17 NOTE — PROGRESS NOTES
exacerbated ectopy and arrhythmia. - The patient is counseled to get regular exercise 3-5 times per week to control cardiovascular risk factors. All questions and concerns were addressed to the patient/family. Alternatives to my treatment were discussed. I have discussed the above stated plan and the patient verbalized understanding and agreed with the plan. Scribe attestation: This note was scribed in the presence of Lakesha David MD by Theresa Bourgeois RN    I, Dr. Lakesha David personally performed the services described in this documentation as scribed by RN in my presence, and it is both accurate and complete. NOTE: This report was transcribed using voice recognition software. Every effort was made to ensure accuracy, however, inadvertent computerized transcription errors may be present.      Lakesha David MD, [facility], [address] [place] [address]   Office: [phone]  Fax: [phone]

## 2023-05-18 ENCOUNTER — OFFICE VISIT (OUTPATIENT)
Dept: CARDIOLOGY CLINIC | Age: 58
End: 2023-05-18
Payer: COMMERCIAL

## 2023-05-18 VITALS
HEIGHT: 67 IN | BODY MASS INDEX: 27.94 KG/M2 | HEART RATE: 70 BPM | DIASTOLIC BLOOD PRESSURE: 60 MMHG | OXYGEN SATURATION: 98 % | WEIGHT: 178 LBS | SYSTOLIC BLOOD PRESSURE: 100 MMHG

## 2023-05-18 DIAGNOSIS — I42.8 NON-ISCHEMIC CARDIOMYOPATHY (HCC): ICD-10-CM

## 2023-05-18 DIAGNOSIS — E78.2 MIXED HYPERLIPIDEMIA: ICD-10-CM

## 2023-05-18 DIAGNOSIS — I47.29 NSVT (NONSUSTAINED VENTRICULAR TACHYCARDIA) (HCC): Primary | ICD-10-CM

## 2023-05-18 PROCEDURE — 93000 ELECTROCARDIOGRAM COMPLETE: CPT | Performed by: INTERNAL MEDICINE

## 2023-05-18 PROCEDURE — 99214 OFFICE O/P EST MOD 30 MIN: CPT | Performed by: INTERNAL MEDICINE

## 2023-05-18 PROCEDURE — 3078F DIAST BP <80 MM HG: CPT | Performed by: INTERNAL MEDICINE

## 2023-05-18 PROCEDURE — 3074F SYST BP LT 130 MM HG: CPT | Performed by: INTERNAL MEDICINE

## 2023-08-09 RX ORDER — VALSARTAN 80 MG/1
80 TABLET ORAL DAILY
Qty: 90 TABLET | Refills: 3 | Status: SHIPPED | OUTPATIENT
Start: 2023-08-09

## 2023-08-09 NOTE — TELEPHONE ENCOUNTER
Last OV: 23 WAK  Next OV: on recall list   Last EK23  Last filled:      Disp Refills Start End    valsartan (DIOVAN) 80 MG tablet 90 tablet 1 2023     Sig - Route:  Take 1 tablet by mouth daily - Oral    Sent to pharmacy as: Valsartan 80 MG Oral Tablet (DIOVAN)    E-Prescribing Status: Receipt confirmed by pharmacy (2023  1:30 PM EST)

## 2023-11-02 DIAGNOSIS — I42.8 NON-ISCHEMIC CARDIOMYOPATHY (HCC): ICD-10-CM

## 2023-11-02 DIAGNOSIS — I47.29 NSVT (NONSUSTAINED VENTRICULAR TACHYCARDIA) (HCC): ICD-10-CM

## 2023-11-02 DIAGNOSIS — I10 HTN (HYPERTENSION), BENIGN: ICD-10-CM

## 2023-11-02 RX ORDER — SPIRONOLACTONE 25 MG/1
25 TABLET ORAL DAILY
Qty: 90 TABLET | Refills: 3 | Status: SHIPPED | OUTPATIENT
Start: 2023-11-02

## 2023-11-02 RX ORDER — FUROSEMIDE 20 MG/1
20 TABLET ORAL DAILY
Qty: 90 TABLET | Refills: 0 | Status: SHIPPED | OUTPATIENT
Start: 2023-11-02 | End: 2024-01-31

## 2023-11-02 RX ORDER — METOPROLOL SUCCINATE 25 MG/1
25 TABLET, EXTENDED RELEASE ORAL DAILY
Qty: 90 TABLET | Refills: 0 | Status: SHIPPED | OUTPATIENT
Start: 2023-11-02 | End: 2024-01-31

## 2023-11-02 RX ORDER — ATORVASTATIN CALCIUM 10 MG/1
10 TABLET, FILM COATED ORAL DAILY
Qty: 90 TABLET | Refills: 0 | Status: SHIPPED | OUTPATIENT
Start: 2023-11-02

## 2023-11-02 NOTE — TELEPHONE ENCOUNTER
Received refill request for Metoprolol,Spironolactone, Furosemide, Atorvastatin from [facility] pharmacy.     Last OV: 05/18/2023    Next OV: None    Last Labs: 05/18/2023    Last Filled:  11/07/2022  metoprolol succinate (TOPROL XL) 25 MG extended release tablet 90 tablet 3 11/7/2022 5/18/2023    Sig - Route: Take 1 tablet by mouth daily - Oral    Sent to pharmacy as: Metoprolol Succinate ER 25 MG Oral Tablet Extended Release 24 Hour (TOPROL XL)    E-Prescribing Status: Receipt confirmed by pharmacy (11/7/2022 10:42 AM EST)      atorvastatin (LIPITOR) 10 MG tablet 90 tablet 3 11/7/2022     Sig - Route: Take 1 tablet by mouth daily - Oral    Sent to pharmacy as: Atorvastatin Calcium 10 MG Oral Tablet (LIPITOR)    E-Prescribing Status: Receipt confirmed by pharmacy (11/7/2022 10:42 AM EST)      furosemide (LASIX) 20 MG tablet 90 tablet 3 11/7/2022 5/18/2023    Sig - Route: Take 1 tablet by mouth daily - Oral    Sent to pharmacy as: Furosemide 20 MG Oral Tablet (LASIX)    E-Prescribing Status: Receipt confirmed by pharmacy (11/7/2022 10:42 AM EST)       Disp Refills Start End    spironolactone (ALDACTONE) 25 MG tablet 90 tablet 3 11/7/2022     Sig - Route: Take 1 tablet by mouth daily - Oral    Sent to pharmacy as: Spironolactone 25 MG Oral Tablet (Aldactone)    E-Prescribing Status: Receipt confirmed by pharmacy (11/7/2022 10:42 AM EST)

## 2023-11-06 RX ORDER — EMPAGLIFLOZIN 10 MG/1
10 TABLET, FILM COATED ORAL DAILY
Qty: 90 TABLET | Refills: 3 | Status: SHIPPED | OUTPATIENT
Start: 2023-11-06

## 2023-11-06 NOTE — TELEPHONE ENCOUNTER
Received refill request for empagliflozin (JARDIANCE) 10 MG tablet     from [facility] pharmacy. Last ov:2023 MXA    Last labs:2023 Chem Profile Care Everywhere        Last Refill:2023    Next appointment:2024 [facility] ER Recall List     Spoke with pharmacy and they said they need a new RX as the one they have on file is .

## 2024-01-31 RX ORDER — FUROSEMIDE 20 MG/1
20 TABLET ORAL DAILY
Qty: 90 TABLET | Refills: 3 | Status: SHIPPED | OUTPATIENT
Start: 2024-01-31

## 2024-01-31 RX ORDER — METOPROLOL SUCCINATE 25 MG/1
25 TABLET, EXTENDED RELEASE ORAL DAILY
Qty: 90 TABLET | Refills: 3 | Status: SHIPPED | OUTPATIENT
Start: 2024-01-31

## 2024-01-31 RX ORDER — ATORVASTATIN CALCIUM 10 MG/1
10 TABLET, FILM COATED ORAL DAILY
Qty: 90 TABLET | Refills: 3 | Status: SHIPPED | OUTPATIENT
Start: 2024-01-31

## 2024-01-31 NOTE — TELEPHONE ENCOUNTER
Received refill request for Metoprolol Succ ER 25mg tabs, Furosemide 20mg tabs and Atorvastatin 10mg Tabs from [facility] pharmacy.     Last OV: 2023 MMA    Next OV: None.     Last Labs: 2023 Lipid at [place]    Last EK2023  MXA    Last Filled: 2023 C[facility]

## 2024-02-07 ENCOUNTER — TELEPHONE (OUTPATIENT)
Dept: CARDIOLOGY CLINIC | Age: 59
End: 2024-02-07

## 2024-05-08 ENCOUNTER — TELEPHONE (OUTPATIENT)
Dept: CARDIOLOGY CLINIC | Age: 59
End: 2024-05-08

## 2024-05-08 NOTE — PROGRESS NOTES
Yes Italo Santiago MD   furosemide (LASIX) 20 MG tablet Take 1 tablet by mouth daily 1/31/24  Yes Italo Santiago MD   metoprolol succinate (TOPROL XL) 25 MG extended release tablet Take 1 tablet by mouth daily 1/31/24  Yes Italo Santiago MD   JARDIANCE 10 MG tablet TAKE 1 TABLET BY MOUTH EVERY DAY 11/6/23  Yes Jose Bose APRN - CNP   busPIRone (BUSPAR) 7.5 MG tablet Take 1 tablet by mouth 2 times daily 5/8/23  Yes Provider, MD Allyson   magnesium oxide (MAG-OX) 400 MG tablet Take by mouth daily 9/29/22  Yes Provider, MD Allyson   traZODone (DESYREL) 100 MG tablet Take by mouth nightly as needed   Yes Provider, MD Allyson     PHYSICAL EXAM        Vitals:    05/20/24 1351   BP: 100/60   Pulse: 55   SpO2: 98%      Weight - Scale: 86.2 kg (190 lb)     Gen Alert, cooperative, no distress Heart  Regular rate and rhythm, no murmur   Head Normocephalic, atraumatic, no abnormalities Abd  Soft, NT   Eyes Conj/corn clear Ext  Ext nl, AT, no C/C, no edema   Nose Nares normal, no drain age, Non-tender Pulse 2+ and symmetric   Throat Lips, mucosa, tongue normal Skin Color/text/turg nl, no rash/lesions   Neck S/S, TM, NT, no bruit Psych Nl mood and affect   Lung CTA-B, unlabored, no DTP     Ch wall NT, no deform       LABS and Imaging     Relevant and available CV data reviewed    A1c 1/6/23 5.9  Lipid 1/6/23  TG79 HDL54 LDL89   BNP 9/24/22 - 117  Troponin I 9/22/22 - 3     EKG personally interpreted: 1/12/23  Sinus  Rhythm   Low voltage in precordial leads.   - Nonspecific T-abnormality.     Repeat Holter 1/12-1/19/23  NSR, Symptoms with sinus with and without PACs   8% PAC 1%PVC     Monitor 30 days from [place] but have been unable to obtain the results.     Echo: Today (5/20/24)  Indication: Non-ischemic cardiomyopathy, NSVT  Summary:     Image quality is technically difficult. Technically difficult study due to patient's heart rhythm.    Left Ventricle: Low normal left ventricular

## 2024-05-08 NOTE — TELEPHONE ENCOUNTER
Katherine from [employer] [facility] called to advise that PA for Ultrasound of Heart/ECHO is approved. Approval number is [id number]. Valid from 5/7/24 to 6/5/2024. Katherine's number is [phone]. Please advise. Thank you.

## 2024-05-20 ENCOUNTER — OFFICE VISIT (OUTPATIENT)
Dept: CARDIOLOGY CLINIC | Age: 59
End: 2024-05-20
Payer: COMMERCIAL

## 2024-05-20 VITALS
WEIGHT: 190 LBS | HEIGHT: 66 IN | OXYGEN SATURATION: 98 % | HEART RATE: 55 BPM | SYSTOLIC BLOOD PRESSURE: 100 MMHG | BODY MASS INDEX: 30.53 KG/M2 | DIASTOLIC BLOOD PRESSURE: 60 MMHG

## 2024-05-20 DIAGNOSIS — I47.29 NSVT (NONSUSTAINED VENTRICULAR TACHYCARDIA) (HCC): ICD-10-CM

## 2024-05-20 DIAGNOSIS — I10 HTN (HYPERTENSION), BENIGN: ICD-10-CM

## 2024-05-20 DIAGNOSIS — I42.8 NON-ISCHEMIC CARDIOMYOPATHY (HCC): Primary | ICD-10-CM

## 2024-05-20 DIAGNOSIS — I49.3 PVC (PREMATURE VENTRICULAR CONTRACTION): ICD-10-CM

## 2024-05-20 PROCEDURE — 3078F DIAST BP <80 MM HG: CPT | Performed by: INTERNAL MEDICINE

## 2024-05-20 PROCEDURE — 99214 OFFICE O/P EST MOD 30 MIN: CPT | Performed by: INTERNAL MEDICINE

## 2024-05-20 PROCEDURE — 3074F SYST BP LT 130 MM HG: CPT | Performed by: INTERNAL MEDICINE

## 2024-05-20 RX ORDER — VALSARTAN 80 MG/1
80 TABLET ORAL DAILY
Qty: 90 TABLET | Refills: 3 | Status: SHIPPED | OUTPATIENT
Start: 2024-05-20

## 2024-05-20 RX ORDER — SPIRONOLACTONE 25 MG/1
25 TABLET ORAL DAILY
Qty: 90 TABLET | Refills: 3 | Status: SHIPPED | OUTPATIENT
Start: 2024-05-20

## 2024-05-20 NOTE — PATIENT INSTRUCTIONS
Continue current medications    Continue risk factor modifications.     Call for any change in symptoms, call to report any changes in shortness of breath or development of chest pain with activity.    Return to office tomorrow for holter monitor placement  Follow up with Dr. Santiago in 1 year with Echo same day

## 2024-06-03 ENCOUNTER — TELEPHONE (OUTPATIENT)
Dept: CARDIOLOGY CLINIC | Age: 59
End: 2024-06-03

## 2024-06-03 NOTE — TELEPHONE ENCOUNTER
----- Message from Italo Santiago MD sent at 6/3/2024 11:44 AM EDT -----  Continue current medications.  Dr Goodrich planning to see her yearly  I talked with Dr. Goodrich and since LVEF is normal with minimal symptoms we'll just continue beta bllocker and annual echos

## 2024-06-03 NOTE — TELEPHONE ENCOUNTER
Attempted to call pt and get them scheduled with [facility] no answer left message to call back   Thank you

## 2024-06-03 NOTE — TELEPHONE ENCOUNTER
----- Message from Bertrand Goodrich MD sent at 6/1/2024  8:27 AM EDT -----  Please make her a f/u in 1 year with me

## 2024-06-17 NOTE — PROGRESS NOTES
[facility]   Electrophysiology    Date: 6/13/2024    No chief complaint on file.        CC: NSVT/ cardiomyopathy    HPI: Brandy Bull is a 58 y.o. female  past medical history HTN, pre-diabetes. She saw Dr. Daniel Kaplan ( [facility]) 09/02/2022 for pre-op clearance. She was noted to have IVR, LBBB and NSVT on EKG in office. ( See media tab).     She was sent to the ER for further evaluation. She had a coronary angiogram that showed  no significant obstructive disease . Echo showed LVEF 35% . NSVT was treated with amiodarone drip and then she was started on Toprol,  Entresto 09/2022.     Echo 5/20/24 showed left Ventricle: Low normal left ventricular systolic function with a visually estimated EF of 50 - 55%, multiple frequent and consecutive PVC'S noted. 3 day monitor 5/2024 NSR, frequent PVC 14%, frequent NSVT, 4 pt events associated with SR and PVC       Interval History:  Brandy presents today in follow up.       Assessment and plan:   NSVT    - Monitor 01/12/2023 - NSR , 1% PVC, 8% PAC symptoms with sinus, 249 NSVT episodes   - Monitor 5/2024 NSR, frequent PVC 14%, frequent NSVT, 4 pt events associated with SR and PVC   - [facility] 09/2022 - non obstructive disease.    - Continue Toprol , magnesium    - TSH 1.532    - If NSVT and PVC persist and she Is symptomatic we can consider AAD/ablation.    - medical treatment is limited by cardiomyopathy    - shoulder blade discomfort is not likley cardiogenic in nature   The EF has improved to 40%.  Continue with medical therapy.  PVC burden is low however she has significant number of NSVT.  However with an EF of 40% and no symptoms, we will continue only with medical therapy.  Repeat echo and repeat monitor is recommended to assess for any change in clinical condition.    Non ischemic cardiomyopathy    - LVEF  5/2024 EF 50-55%   - LVEF  12/19/2022 40%    - LVEF  30-35%  09/2022    - allergy to lisinopril    - continue  Jardiance, toprol, lasix, Entresto - 
[facility]   Electrophysiology    Date: 6/20/2024    CC: PVC's  HPI: Brandy Bull is a 58 y.o. female  past medical history HTN, pre-diabetes. She saw Dr. Daniel Kaplan ( [facility]) 09/02/2022 for pre-op clearance. She was noted to have IVR, LBBB and NSVT on EKG in office. ( See media tab).     She was sent to the ER for further evaluation. She had a coronary angiogram that showed  no significant obstructive disease . Echo showed LVEF 35% . NSVT was treated with amiodarone drip and then she was started on Toprol,  Entresto 09/2022.     Echo 5/20/2024 showed left Ventricle: Low normal left ventricular systolic function with a visually estimated EF of 50 - 55%, multiple frequent and consecutive PVC'S noted. 3 day monitor 5/2024 NSR, frequent PVC 14%, frequent NSVT, 4 pt events associated with SR and PVC       Interval History:  Brandy presents today in follow up.   She has an increased PVC burden but her EF has significantly improved. She remains asymptomatic with her PVC's. Patient denies lightheadedness, dizziness, chest pain, palpitations, orthopnea, edema, presyncope or syncope. Mild shortness of breath when carrying laundry upstairs.      Assessment and plan:   NSVT    - Monitor 01/12/2023 - NSR , 1% PVC, 8% PAC symptoms with sinus, 249 NSVT episodes   - [facility] 9/2022 - non obstructive disease.    - Continue Toprol , magnesium    - TSH 1.532    - If NSVT and PVC persist and she Is symptomatic we can consider AAD/ablation.    - medical treatment is limited by cardiomyopathy    - shoulder blade discomfort is not likley cardiogenic in nature   - Monitor 5/2024 NSR, frequent PVC 14%, frequent NSVT, 4 pt events associated with SR and PVC    - PVC's are suggestive of outflow tract PVC's and discussed that the morphology is less concerning, relatively benign and common. If she begins to have symptoms or her EF becomes affected then we can consider further intervention    -We will do a cardiac MRI to 
0

## 2024-06-20 ENCOUNTER — OFFICE VISIT (OUTPATIENT)
Dept: CARDIOLOGY CLINIC | Age: 59
End: 2024-06-20
Payer: COMMERCIAL

## 2024-06-20 VITALS
BODY MASS INDEX: 29.95 KG/M2 | HEART RATE: 57 BPM | DIASTOLIC BLOOD PRESSURE: 64 MMHG | SYSTOLIC BLOOD PRESSURE: 100 MMHG | HEIGHT: 67 IN | OXYGEN SATURATION: 97 % | WEIGHT: 190.8 LBS

## 2024-06-20 DIAGNOSIS — I10 HTN (HYPERTENSION), BENIGN: ICD-10-CM

## 2024-06-20 DIAGNOSIS — R06.02 SHORTNESS OF BREATH: ICD-10-CM

## 2024-06-20 DIAGNOSIS — I49.3 FREQUENT PVCS: Primary | ICD-10-CM

## 2024-06-20 DIAGNOSIS — I42.8 NON-ISCHEMIC CARDIOMYOPATHY (HCC): ICD-10-CM

## 2024-06-20 DIAGNOSIS — I47.29 NSVT (NONSUSTAINED VENTRICULAR TACHYCARDIA) (HCC): ICD-10-CM

## 2024-06-20 PROCEDURE — 3078F DIAST BP <80 MM HG: CPT | Performed by: INTERNAL MEDICINE

## 2024-06-20 PROCEDURE — 99214 OFFICE O/P EST MOD 30 MIN: CPT | Performed by: INTERNAL MEDICINE

## 2024-06-20 PROCEDURE — 93000 ELECTROCARDIOGRAM COMPLETE: CPT | Performed by: INTERNAL MEDICINE

## 2024-06-20 PROCEDURE — 3074F SYST BP LT 130 MM HG: CPT | Performed by: INTERNAL MEDICINE

## 2024-07-24 ENCOUNTER — HOSPITAL ENCOUNTER (OUTPATIENT)
Dept: MRI IMAGING | Age: 59
Discharge: HOME | End: 2024-07-24
Attending: INTERNAL MEDICINE
Payer: COMMERCIAL

## 2024-07-24 DIAGNOSIS — I49.3 FREQUENT PVCS: ICD-10-CM

## 2024-07-24 DIAGNOSIS — I47.29 NSVT (NONSUSTAINED VENTRICULAR TACHYCARDIA) (HCC): ICD-10-CM

## 2024-07-24 DIAGNOSIS — I42.8 NON-ISCHEMIC CARDIOMYOPATHY (HCC): ICD-10-CM

## 2024-07-24 PROCEDURE — 75565 CARD MRI VELOC FLOW MAPPING: CPT

## 2024-07-24 PROCEDURE — A9585 GADOBUTROL INJECTION: HCPCS | Performed by: INTERNAL MEDICINE

## 2024-07-24 PROCEDURE — 6360000004 HC RX CONTRAST MEDICATION: Performed by: INTERNAL MEDICINE

## 2024-07-24 RX ORDER — GADOBUTROL 604.72 MG/ML
13 INJECTION INTRAVENOUS
Status: COMPLETED | OUTPATIENT
Start: 2024-07-24 | End: 2024-07-24

## 2024-07-24 RX ADMIN — GADOBUTROL 13 ML: 604.72 INJECTION INTRAVENOUS at 15:46

## 2024-08-26 ENCOUNTER — PATIENT MESSAGE (OUTPATIENT)
Dept: CARDIOLOGY CLINIC | Age: 59
End: 2024-08-26

## 2024-10-23 RX ORDER — EMPAGLIFLOZIN 10 MG/1
10 TABLET, FILM COATED ORAL DAILY
Qty: 90 TABLET | Refills: 3 | Status: SHIPPED | OUTPATIENT
Start: 2024-10-23

## 2024-10-23 NOTE — TELEPHONE ENCOUNTER
Received refill request for JARDIANCE 10 MG tablet  from [facility] pharmacy.     Last OV: 6/20/24    Next OV: 12/05/24    Last Labs:     Last Filled: 11/6/23

## 2024-11-13 NOTE — PROGRESS NOTES
[facility]     FOLLOW-UP VISIT  [phone]  12/5/24  Referring:  Isaura Kaba APRN - CNP (PCP)    REASON FOR CONSULT/CHIEF COMPLAINT/HPI     Reason for visit/ Chief complaint  Heart Failure Management/PVCs   HPI Brandy Bull is a 59 y.o. female patient here for 6 month follow up and echo today.     She has NICM that is most likely due to frequent PVCs.  LVEF has recovered with medical therapy.  I co-manage her case along with Dr. Goodrich from EP.    When Dr. Goodrich saw her in May, he ordered a cardiac MRI with plans to repeat an echo 6 months later to ensure LVEF is stable.    She has had occasional runs of VT which appears to be a benign outflow tract VT.  She has not had syncope.  Coronary angiogram in 2022 was normal.    LVEF was iniitially 35% at diagnosis in 2022.     Although her PVC/VT burden remains fairly high, with medical therapy, her LV function is now  normal. She is not symptomatic with PVCs.    Today she had her echo just prior to seeing me. Her EF is right around 50%. It is difficult to say exactly due to her PVC's. No complaints of CP, SOB, dizziness at this time. She feels the palpitations at times, but thinks it is more due to her being aware of them. She takes her lasix daily and denies any swelling.     Patient is adherent with medications and is tolerating them well without side effects     HISTORY/ALLERGIES/ROS     MedHx:  has a past medical history of Diabetes (HCC) and Hyperlipidemia.  SurgHx:  has no past surgical history on file.   SocHx:  reports that she has never smoked. She has never used smokeless tobacco. She reports that she does not currently use alcohol. She reports that she does not use drugs.   FamHx:   Family History   Problem Relation Age of Onset    Thyroid Disease Mother     Cancer Father         thyroid    Parkinson's Disease Father     Cancer Maternal Grandfather         colon     Allergies: Lisinopril     MEDICATIONS      Prior to Admission

## 2024-11-21 PROBLEM — I49.3 FREQUENT PVCS: Status: ACTIVE | Noted: 2024-11-21

## 2024-12-05 ENCOUNTER — OFFICE VISIT (OUTPATIENT)
Dept: CARDIOLOGY CLINIC | Age: 59
End: 2024-12-05
Payer: COMMERCIAL

## 2024-12-05 VITALS
HEART RATE: 90 BPM | SYSTOLIC BLOOD PRESSURE: 102 MMHG | BODY MASS INDEX: 29.98 KG/M2 | DIASTOLIC BLOOD PRESSURE: 82 MMHG | HEIGHT: 67 IN | OXYGEN SATURATION: 95 % | WEIGHT: 191 LBS

## 2024-12-05 DIAGNOSIS — I47.29 NSVT (NONSUSTAINED VENTRICULAR TACHYCARDIA) (HCC): ICD-10-CM

## 2024-12-05 DIAGNOSIS — I49.3 VENTRICULAR PREMATURE DEPOLARIZATION: ICD-10-CM

## 2024-12-05 DIAGNOSIS — I42.8 NON-ISCHEMIC CARDIOMYOPATHY (HCC): ICD-10-CM

## 2024-12-05 DIAGNOSIS — R06.02 SHORTNESS OF BREATH: ICD-10-CM

## 2024-12-05 DIAGNOSIS — E78.2 MIXED HYPERLIPIDEMIA: ICD-10-CM

## 2024-12-05 DIAGNOSIS — I10 HTN (HYPERTENSION), BENIGN: ICD-10-CM

## 2024-12-05 DIAGNOSIS — I49.3 FREQUENT PVCS: Primary | ICD-10-CM

## 2024-12-05 PROCEDURE — 3074F SYST BP LT 130 MM HG: CPT | Performed by: INTERNAL MEDICINE

## 2024-12-05 PROCEDURE — 99214 OFFICE O/P EST MOD 30 MIN: CPT | Performed by: INTERNAL MEDICINE

## 2024-12-05 PROCEDURE — 3079F DIAST BP 80-89 MM HG: CPT | Performed by: INTERNAL MEDICINE

## 2024-12-05 RX ORDER — METOPROLOL SUCCINATE 50 MG/1
50 TABLET, EXTENDED RELEASE ORAL DAILY
Qty: 90 TABLET | Refills: 3 | Status: SHIPPED | OUTPATIENT
Start: 2024-12-05

## 2024-12-05 NOTE — PATIENT INSTRUCTIONS
Follow up with Dr Santiago in 1 year with echo same day     1 week monitor     Discontinue valsartan  Increase Metoprolol to 50 mg daily     Call for any questions or concerns.

## 2024-12-18 ENCOUNTER — TELEPHONE (OUTPATIENT)
Dept: CARDIOLOGY CLINIC | Age: 59
End: 2024-12-18

## 2024-12-18 NOTE — TELEPHONE ENCOUNTER
----- Message from Dr. Italo Santiago MD sent at 12/18/2024 10:38 AM EST -----  PVC burden about the same - Continue current medications.    Will need yearly echo.  Only would need to ablate if she becomes more symptomatic or if her EF goes down.

## 2024-12-18 NOTE — TELEPHONE ENCOUNTER
Please call pt to advise per [email or web address] message. Order is in already for echo, pt can schedule for that and OV same day.  Thank you!

## 2024-12-19 ENCOUNTER — TELEPHONE (OUTPATIENT)
Dept: CARDIOLOGY CLINIC | Age: 59
End: 2024-12-19

## 2025-01-13 ENCOUNTER — TELEPHONE (OUTPATIENT)
Dept: CARDIOLOGY CLINIC | Age: 60
End: 2025-01-13

## 2025-01-13 DIAGNOSIS — R60.1 GENERALIZED EDEMA: Primary | ICD-10-CM

## 2025-01-13 DIAGNOSIS — I42.8 NON-ISCHEMIC CARDIOMYOPATHY (HCC): ICD-10-CM

## 2025-01-13 RX ORDER — FUROSEMIDE 40 MG/1
40 TABLET ORAL DAILY
Qty: 90 TABLET | Refills: 3
Start: 2025-01-13

## 2025-01-13 NOTE — TELEPHONE ENCOUNTER
Called and spoke to pt and advised per [facility] message. Lasix updated in epic to 40mg. Pt advised on daily weights and to call the office with 2 lbs overnight or 5 lbs in a week and to go to ED if symptoms worsen. V/u and agreeable to plan.

## 2025-01-13 NOTE — TELEPHONE ENCOUNTER
Patients states that last night she experienced difficulty taking deep full breathes and getting sleep. She states that the difficulties with breathing only took place when laying down.    This morning she notes her fingers and ankles appeared to be puffy/ swollen.      She would like to know what CELY advises.

## 2025-01-14 ENCOUNTER — TELEPHONE (OUTPATIENT)
Dept: CARDIOLOGY CLINIC | Age: 60
End: 2025-01-14

## 2025-01-14 ENCOUNTER — HOSPITAL ENCOUNTER (OUTPATIENT)
Age: 60
Discharge: HOME | End: 2025-01-14
Payer: COMMERCIAL

## 2025-01-14 DIAGNOSIS — R60.1 GENERALIZED EDEMA: Primary | ICD-10-CM

## 2025-01-14 DIAGNOSIS — R60.1 GENERALIZED EDEMA: ICD-10-CM

## 2025-01-14 DIAGNOSIS — I42.8 NON-ISCHEMIC CARDIOMYOPATHY (HCC): ICD-10-CM

## 2025-01-14 LAB
ANION GAP SERPL CALCULATED.3IONS-SCNC: 12 MMOL/L (ref 3–16)
BUN SERPL-MCNC: 13 MG/DL (ref 7–20)
CALCIUM SERPL-MCNC: 9.5 MG/DL (ref 8.3–10.6)
CHLORIDE SERPL-SCNC: 103 MMOL/L (ref 99–110)
CO2 SERPL-SCNC: 25 MMOL/L (ref 21–32)
CREAT SERPL-MCNC: 0.9 MG/DL (ref 0.6–1.1)
CREAT UR-MCNC: 109 MG/DL (ref 28–259)
GFR SERPLBLD CREATININE-BSD FMLA CKD-EPI: 74 ML/MIN/{1.73_M2}
GLUCOSE SERPL-MCNC: 114 MG/DL (ref 70–99)
NT-PROBNP SERPL-MCNC: 245 PG/ML (ref 0–124)
POTASSIUM SERPL-SCNC: 3.9 MMOL/L (ref 3.5–5.1)
PROT UR-MCNC: 26.1 MG/DL
PROT/CREAT UR-RTO: 0.2 MG/DL
SODIUM SERPL-SCNC: 140 MMOL/L (ref 136–145)

## 2025-01-14 PROCEDURE — 83880 ASSAY OF NATRIURETIC PEPTIDE: CPT

## 2025-01-14 PROCEDURE — 84156 ASSAY OF PROTEIN URINE: CPT

## 2025-01-14 PROCEDURE — 82570 ASSAY OF URINE CREATININE: CPT

## 2025-01-14 PROCEDURE — 80048 BASIC METABOLIC PNL TOTAL CA: CPT

## 2025-01-14 PROCEDURE — 36415 COLL VENOUS BLD VENIPUNCTURE: CPT

## 2025-01-14 NOTE — TELEPHONE ENCOUNTER
----- Message from Dr. Italo Santiago MD sent at 1/14/2025  3:27 PM EST -----  BNP slightly high - continue plan of increasing diuretics per yesterday    No apparent protein in urine    Make sure she weighs herself daily  Let's repeat BMP/BNP next week

## 2025-01-14 NOTE — TELEPHONE ENCOUNTER
Attempted to call pt per [employer] message. NA/LVM. Lab orders placed.     PLEASE ADVISE PT PER [employer] MESSAGE IF SHE RETURNS THE CALL. THANK YOU!

## 2025-01-21 ENCOUNTER — TELEPHONE (OUTPATIENT)
Dept: CARDIOLOGY CLINIC | Age: 60
End: 2025-01-21

## 2025-01-21 ENCOUNTER — HOSPITAL ENCOUNTER (OUTPATIENT)
Age: 60
Discharge: HOME | End: 2025-01-21
Payer: COMMERCIAL

## 2025-01-21 DIAGNOSIS — R60.1 GENERALIZED EDEMA: ICD-10-CM

## 2025-01-21 DIAGNOSIS — I42.8 NON-ISCHEMIC CARDIOMYOPATHY (HCC): ICD-10-CM

## 2025-01-21 LAB
ANION GAP SERPL CALCULATED.3IONS-SCNC: 12 MMOL/L (ref 3–16)
BUN SERPL-MCNC: 15 MG/DL (ref 7–20)
CALCIUM SERPL-MCNC: 9.9 MG/DL (ref 8.3–10.6)
CHLORIDE SERPL-SCNC: 101 MMOL/L (ref 99–110)
CO2 SERPL-SCNC: 25 MMOL/L (ref 21–32)
CREAT SERPL-MCNC: 0.8 MG/DL (ref 0.6–1.1)
GFR SERPLBLD CREATININE-BSD FMLA CKD-EPI: 86 ML/MIN/{1.73_M2}
GLUCOSE SERPL-MCNC: 112 MG/DL (ref 70–99)
NT-PROBNP SERPL-MCNC: 63 PG/ML (ref 0–124)
POTASSIUM SERPL-SCNC: 4.3 MMOL/L (ref 3.5–5.1)
SODIUM SERPL-SCNC: 138 MMOL/L (ref 136–145)

## 2025-01-21 PROCEDURE — 36415 COLL VENOUS BLD VENIPUNCTURE: CPT

## 2025-01-21 PROCEDURE — 83880 ASSAY OF NATRIURETIC PEPTIDE: CPT

## 2025-01-21 PROCEDURE — 80048 BASIC METABOLIC PNL TOTAL CA: CPT

## 2025-01-21 NOTE — TELEPHONE ENCOUNTER
----- Message from Dr. Italo Santiago MD sent at 1/21/2025  2:44 PM EST -----  Labs look much better  She can go back to once a day diuretics

## 2025-01-23 RX ORDER — ATORVASTATIN CALCIUM 10 MG/1
10 TABLET, FILM COATED ORAL DAILY
Qty: 90 TABLET | Refills: 3 | Status: SHIPPED | OUTPATIENT
Start: 2025-01-23

## 2025-01-23 NOTE — TELEPHONE ENCOUNTER
Received refill request for Atorvastatin 10 MGfrom [facility] [address] pharmacy.     Last OV: 12/05/24    Next OV: none    Last Labs: Lipid 05/16/24 [facility]     Last Filled: 01/31/24

## 2025-02-10 RX ORDER — FUROSEMIDE 40 MG/1
40 TABLET ORAL DAILY
Qty: 90 TABLET | Refills: 3 | OUTPATIENT
Start: 2025-02-10

## 2025-02-13 ENCOUNTER — TELEPHONE (OUTPATIENT)
Dept: CARDIOLOGY CLINIC | Age: 60
End: 2025-02-13

## 2025-02-13 RX ORDER — FUROSEMIDE 20 MG/1
20 TABLET ORAL DAILY
Qty: 90 TABLET | Refills: 3 | Status: SHIPPED | OUTPATIENT
Start: 2025-02-13

## 2025-02-13 NOTE — TELEPHONE ENCOUNTER
Medication Refill    Medication needing refilled:  Furosemide   magnesium oxide     Dosage of the medication:   20mg  400 MG     How are you taking this medication (QD, BID, TID, QID, PRN):   Take 1 tablet by mouth daily   Take by mouth daily     30 or 90 day supply called in:   90 90    When will you run out of your medication:    Which Pharmacy are we sending the medication to?:     [facility]/pharmacy #[id number]  [address] [address]  Phone: [phone]   Fax: [phone]     NOTE: Pt is requesting a new script for the furosemide 20mg not 40mg. Thank you

## 2025-02-13 NOTE — TELEPHONE ENCOUNTER
Spoke with patient and advised her of the message from SAAD Quezada RN.  Verbalized understanding.

## 2025-07-18 DIAGNOSIS — I47.29 NSVT (NONSUSTAINED VENTRICULAR TACHYCARDIA) (HCC): ICD-10-CM

## 2025-07-18 DIAGNOSIS — I42.8 NON-ISCHEMIC CARDIOMYOPATHY (HCC): ICD-10-CM

## 2025-07-18 DIAGNOSIS — I10 HTN (HYPERTENSION), BENIGN: ICD-10-CM

## 2025-07-18 RX ORDER — SPIRONOLACTONE 25 MG/1
25 TABLET ORAL DAILY
Qty: 90 TABLET | Refills: 3 | Status: SHIPPED | OUTPATIENT
Start: 2025-07-18

## 2025-07-18 NOTE — TELEPHONE ENCOUNTER
Medication Requested: Spironolactione   Preferred Pharmacy: [facility]  Last OV: 12/05/2025 [facility]  Next OV: Recall list  Labs/EKG: BMP 01/21/2025